# Patient Record
Sex: MALE | Race: WHITE | URBAN - METROPOLITAN AREA
[De-identification: names, ages, dates, MRNs, and addresses within clinical notes are randomized per-mention and may not be internally consistent; named-entity substitution may affect disease eponyms.]

---

## 2017-01-25 ENCOUNTER — OFFICE VISIT (OUTPATIENT)
Dept: INTERNAL MEDICINE | Facility: CLINIC | Age: 22
End: 2017-01-25
Payer: COMMERCIAL

## 2017-01-25 VITALS
RESPIRATION RATE: 16 BRPM | BODY MASS INDEX: 26.41 KG/M2 | TEMPERATURE: 98.2 F | OXYGEN SATURATION: 98 % | SYSTOLIC BLOOD PRESSURE: 132 MMHG | HEIGHT: 73 IN | WEIGHT: 199.3 LBS | HEART RATE: 72 BPM | DIASTOLIC BLOOD PRESSURE: 86 MMHG

## 2017-01-25 DIAGNOSIS — R41.840 DIFFICULTY CONCENTRATING: Primary | ICD-10-CM

## 2017-01-25 PROCEDURE — 99213 OFFICE O/P EST LOW 20 MIN: CPT | Performed by: INTERNAL MEDICINE

## 2017-01-25 NOTE — MR AVS SNAPSHOT
After Visit Summary   1/25/2017    Jayme Hbeert    MRN: [id number]           Patient Information     Date Of Birth          1995        Visit Information        Provider Department      1/25/2017 10:20 AM Jose Rico MD [facility]        Today's Diagnoses     Difficulty concentrating    -  1        Follow-ups after your visit        Additional Services     MENTAL HEALTH REFERRAL       Your provider has referred you to: [facility]: [facility] - ADHD & Bariatric Assessments - Adult ADHD Evaluations - [facility] - [place] [phone]   [email or web address]   *Please call to schedule an appointment.    All scheduling is subject to the client's specific insurance plan & benefits, provider/location availability, and provider clinical specialities.  Please arrive 15 minutes early for your first appointment and bring your completed paperwork.    Please be aware that coverage of these services is subject to the terms and limitations of your health insurance plan.  Call member services at your health plan with any benefit or coverage questions.                  Who to contact     If you have questions or need follow up information about today's clinic visit or your schedule please contact [facility] directly at [phone].  Normal or non-critical lab and imaging results will be communicated to you by MyChart, letter or phone within 4 business days after the clinic has received the results. If you do not hear from us within 7 days, please contact the clinic through MyChart or phone. If you have a critical or abnormal lab result, we will notify you by phone as soon as possible.  Submit refill requests through [employer] or call your pharmacy and they will forward the refill request to us. Please allow 3 business days for your refill to be completed.          Additional  "Information About Your Visit        MyChart Information     [employer] lets you send messages to your doctor, view your test results, renew your prescriptions, schedule appointments and more. To sign up, go to [email or web address] . Click on \"Log in\" on the left side of the screen, which will take you to the Welcome page. Then click on \"Sign up Now\" on the right side of the page.     You will be asked to enter the access code listed below, as well as some personal information. Please follow the directions to create your username and password.     Your access code is: [email or web address]  Expires: 2017 10:43 AM     Your access code will  in 90 days. If you need help or a new code, please call your [facility] clinic or [phone].        Care EveryWhere ID     This is your Care EveryWhere ID. This could be used by other organizations to access your [facility] medical records  [id number]        Your Vitals Were     Pulse Temperature Respirations Height BMI (Body Mass Index) Pulse Oximetry    72 98.2  F (36.8  C) (Oral) 16 6' 1\" (1.854 m) 26.30 kg/m2 98%       Blood Pressure from Last 3 Encounters:   17 132/86   06/18/15 122/84   14 122/72    Weight from Last 3 Encounters:   17 199 lb 4.8 oz (90.402 kg)   06/18/15 190 lb (86.183 kg)   14 196 lb (88.905 kg)              We Performed the Following     MENTAL HEALTH REFERRAL        Primary Care Provider    None Specified       No primary provider on file.        Thank you!     Thank you for choosing [facility]  for your care. Our goal is always to provide you with excellent care. Hearing back from our patients is one way we can continue to improve our services. Please take a few minutes to complete the written survey that you may receive in the mail after your visit with us. Thank you!             Your Updated Medication List - Protect others around you: Learn how to safely use, store and throw away your medicines at " www.disposemymeds.org.          This list is accurate as of: 1/25/17 10:43 AM.  Always use your most recent med list.                   Brand Name Dispense Instructions for use    cetirizine 10 MG tablet    zyrTEC    30 tablet    Take 1 tablet (10 mg) by mouth every evening       diclofenac sodium 3 % Gel     100 g    Apply 2-4 g up to 4 times daily for knee pain       fluticasone 50 MCG/ACT spray    FLONASE    1 Package    Spray 2 sprays into both nostrils daily       IBUPROFEN PO

## 2017-01-25 NOTE — PROGRESS NOTES
SUBJECTIVE:                                                    Jayme Hebert is a 21 year old male who presents to clinic today for the following health issues:    Trouble concentrating  - noticed during school but now more - difficulty concentrating at work. Gets distracted. Unable to stay on task    Duration: for years but getting worse    Description:  Depression: no  Anxiety: only situational  Panic attacks: no      Accompanying signs and symptoms: see PHQ-9 and ESTHER scores    History (similar episodes/previous evaluation): Problems Focusing    Precipitating or alleviating factors: None    Therapies tried and outcome: none     Past Medical History   Diagnosis Date     NO ACTIVE PROBLEMS      Past Surgical History   Procedure Laterality Date     No history of surgery       Current Outpatient Prescriptions   Medication Sig Dispense Refill     IBUPROFEN PO        cetirizine (ZYRTEC) 10 MG tablet Take 1 tablet (10 mg) by mouth every evening 30 tablet 1     fluticasone (FLONASE) 50 MCG/ACT nasal spray Spray 2 sprays into both nostrils daily 1 Package 11     diclofenac sodium 3 % GEL Apply 2-4 g up to 4 times daily for knee pain 100 g 0     Allergies as of 01/25/2017 - reviewed 01/25/2017   Allergen Reaction Noted     Amoxicillin Hives 12/26/2012     Seasonal allergies  07/16/2014       Social History     Social History     Marital Status: Single     Spouse Name: N/A     Number of Children: N/A     Years of Education: N/A     Occupational History     Not on file.     Social History Main Topics     Smoking status: Never Smoker      Smokeless tobacco: Never Used     Alcohol Use: 3.6 - 4.2 oz/week     6-7 Standard drinks or equivalent per week     Drug Use: No     Sexual Activity:     Partners: Female     Birth Control/ Protection: Condom     Other Topics Concern     Not on file     Social History Narrative       Family History   Problem Relation Age of Onset     Family History Negative Father      Family History  "Negative Mother    Problem list and histories reviewed & adjusted, as indicated.  Additional history: as documented    Problem list, Medication list, Allergies, and Medical/Social/Surgical histories reviewed in EPIC and updated as appropriate.    ROS:  Constitutional, HEENT, cardiovascular, pulmonary, gi and gu systems are negative, except as otherwise noted.    OBJECTIVE:                                                    /86 mmHg  Pulse 72  Temp(Src) 98.2  F (36.8  C) (Oral)  Resp 16  Ht 6' 1\" (1.854 m)  Wt 199 lb 4.8 oz (90.402 kg)  BMI 26.30 kg/m2  SpO2 98%  Body mass index is 26.3 kg/(m^2).  GENERAL APPEARANCE: healthy, alert and no distress         ASSESSMENT/PLAN:                                                    1. Difficulty concentrating  ADD eval thru psychology  - MENTAL HEALTH REFERRAL      Follow up with Provider - after eval     Total of 20 minutes were spent with the patient today, over 50% of which was spent in education and/or counselling.       Jose Rico MD  [facility]      "

## 2017-01-26 ASSESSMENT — PATIENT HEALTH QUESTIONNAIRE - PHQ9: SUM OF ALL RESPONSES TO PHQ QUESTIONS 1-9: 8
